# Patient Record
(demographics unavailable — no encounter records)

---

## 2018-01-11 NOTE — RESULT NOTES
Verified Results  (LC) TSH Rfx on Abnormal to Free T4 21Mar2016 10:00AM Maylin Darby     Test Name Result Flag Reference   TSH 1 190 uIU/mL  0 450-4 500

## 2018-01-11 NOTE — RESULT NOTES
Verified Results  (1) CBC/PLT/DIFF [id number] 10:00AM [employer]     Test Name Result Flag Reference   WBC 6 4 x10E3/uL  3 4-10 8   RBC 4 38 x10E6/uL  3 77-5 28   Hemoglobin 11 7 g/dL  11 1-15 9   Hematocrit 37 0 %  34 0-46  6   MCV 85 fL  79-97   MCH 26 7 pg  26 6-33 0   MCHC 31 6 g/dL  31 5-35 7   RDW 15 7 % H 12 3-15 4   Platelets 859 N07K1/GZ  150-379   Neutrophils 60 %     Lymphs 31 %     Monocytes 7 %     Eos 1 %     Basos 1 %     Neutrophils (Absolute) 3 9 x10E3/uL  1 4-7 0   Lymphs (Absolute) 2 0 x10E3/uL  0 7-3 1   Monocytes(Absolute) 0 4 x10E3/uL  0 1-0 9   Eos (Absolute) 0 1 x10E3/uL  0 0-0 4   Baso (Absolute) 0 0 x10E3/uL  0 0-0 2   Immature Granulocytes 0 %     Immature Grans (Abs) 0 0 x10E3/uL  0 0-0 1     (1) COMPREHENSIVE METABOLIC PANEL [id number] 90:78EN [employer]     Test Name Result Flag Reference   Glucose, Serum 94 mg/dL  65-99   BUN 10 mg/dL  6-24   Creatinine, Serum 0 79 mg/dL  0 57-1 00   eGFR If NonAfricn Am 92 mL/min/1 73  >59   eGFR If Africn Am 106 mL/min/1 73  >59   BUN/Creatinine Ratio 13  9-23   Sodium, Serum 139 mmol/L  134-144   Potassium, Serum 4 8 mmol/L  3 5-5 2   Chloride, Serum 102 mmol/L     Carbon Dioxide, Total 22 mmol/L  18-29   Calcium, Serum 8 8 mg/dL  8 7-10 2   Protein, Total, Serum 6 8 g/dL  6 0-8 5   Albumin, Serum 4 2 g/dL  3 5-5 5   Globulin, Total 2 6 g/dL  1 5-4 5   A/G Ratio 1 6  1 1-2 5   Bilirubin, Total 0 4 mg/dL  0 0-1 2   Alkaline Phosphatase, S 67 IU/L     AST (SGOT) 19 IU/L  0-40   ALT (SGPT) 13 IU/L  0-32     (1) VITAMIN D 25-HYDROXY 21Mar2016 10:00AM Alta Felton     Test Name Result Flag Reference   Vitamin D, 25-Hydroxy 28 8 ng/mL L 30 0-100 0   Vitamin D deficiency has been defined by the [place] of  Medicine and an Endocrine Society practice guideline as a  level of serum 25-OH vitamin D less than 20 ng/mL (1,2)    The Endocrine Society went on to further define vitamin D  insufficiency as a level between 21 and 29 ng/mL (2)   1  IOM ([place] of [employer])  2010  Dietary reference     intakes for calcium and D  [facility]: The     [employer]  2  Alisson MF, Bobby BOYER, Radha HAMPTON, et al      Evaluation, treatment, and prevention of vitamin D     deficiency: an Endocrine Society clinical practice     guideline  [facility]  2011 Jul; 96(7):1911-30  (1) VITAMIN B12 21Mar2016 10:00AM [employer]     Test Name Result Flag Reference   Vitamin B12 1076 pg/mL H 211-946     (LC) LP [id number] 10:00AM [employer]     Test Name Result Flag Reference   Cholesterol, Total 270 mg/dL H 100-199   Triglycerides 123 mg/dL  0-149   HDL Cholesterol 68 mg/dL  >39   According to ATP-III Guidelines, HDL-C >59 mg/dL is considered a  negative risk factor for CHD     VLDL Cholesterol Antonio 25 mg/dL  5-40   LDL Cholesterol Calc 177 mg/dL H 0-99

## 2018-01-18 NOTE — PROGRESS NOTES
Assessment    1  Sore throat (462) (J02 9)    Discussion/Summary  Discussion Summary:   Symptoms are likely secondary to allergies  I do not see any evidence of streptococcal sore throat  Rapid strep test is negative  At this point I advised to use Zyrtec D/Claritin-D/Allegra-D for not more than 5-7 days followed by plain Zyrtec Claritin or Allegra  If symptoms not improving or worse, follow-up with PCP for reevaluation in 7 days  Medication Side Effects Reviewed: Possible side effects of new medications were reviewed with the patient/guardian today  Understands and agrees with treatment plan: The treatment plan was reviewed with the patient/guardian  The patient/guardian understands and agrees with the treatment plan      Chief Complaint    1  Sore Throat  Chief Complaint Free Text Note Form: Pt with sore throat for 3 day  and headache today  Denies fever  History of Present Illness  Hospital Based Practices Required Assessment:   Pain Assessment   the patient states they have pain  The pain is located in the throat  The patient describes the pain as aching  (on a scale of 0 to 10, the patient rates the pain at 3 )   Abuse And Domestic Violence Screen    Yes, the patient is safe at home  The patient states no one is hurting them  Depression And Suicide Screen  No, the patient has not had thoughts of hurting themself  No, the patient has not felt depressed in the past 7 days  Prefered Language is  english  Primary Language is  english  Sore Throat: [address] presents with complaints of sore throat  Associated symptoms include dysphagia, nasal congestion, postnasal drainage, headache and cough, but no odynophagia, no swollen glands, no myalgias, no drooling, no stridor, no fever, no chills, no hoarseness, no neck stiffness, no ear pain, no facial pain, no nausea, no vomiting, no rash, no anorexia and no fatigue        Review of Systems  Focused-Female:   Constitutional: as noted in HPI       Active Problems    1  Anxiety (300 00) (F41 9)   2  Dyslipidemia (272 4) (E78 5)   3  Encounter for preventive health examination (V70 0) (Z00 00)   4  Fatigue (780 79) (R53 83)   5  Flu vaccine need (V04 81) (Z23)   6  Hemorrhoids (455 6) (K64 9)   7  Menstrual migraine without status migrainosus, not intractable (346 40) (G43 829)   8  Obesity (278 00) (E66 9)   9  Vitamin D deficiency (268 9) (E55 9)    Past Medical History    1  History of renal calculi (V13 01) (X53 600)    Family History    1  Family history of hypertension (V17 49) (Z82 49)    2  Family history of malignant neoplasm of prostate (V16 42) (Z80 42)    3  Family history of diabetes mellitus (V18 0) (Z83 3)    4  Family history of glaucoma (V19 11) (Z83 511)    5  Family history of malignant neoplasm of stomach (V16 0) (Z80 0)    Social History    · Caffeine use (V49 89) (F15 90)   · Never smoker   · No drug use   · Rarely consumes alcohol (V49 89) (Z78 9)    Current Meds   1  Multivitamin TABS; TAKE 1 TABLET DAILY; Therapy: (Recorded:21Mar2016) to Recorded   2  Vitamin B Complex CAPS; TAKE 1 CAPSULE DAILY; Therapy: ([facility]) to Recorded   3  Vitamin D 2000 UNIT Oral Tablet; Take 1 tablet daily; Therapy: (Recorded:21Mar2016) to Recorded    Allergies    1  No Known Drug Allergies    Vitals  Signs [Data Includes: Current Encounter]   Recorded: 10Apr2016 11:29AM   Temperature: 97 9 F  Heart Rate: 84  Respiration: 20  Systolic: 031  Diastolic: 78  Height: 5 ft 4 5 in  Weight: 222 lb 12 8 oz  BMI Calculated: 37 65  BSA Calculated: 2 06  O2 Saturation: 100  Pain Scale: 3    Physical Exam    Constitutional   General appearance: No acute distress, well appearing and well nourished  Ears, Nose, Mouth, and Throat   External inspection of ears and nose: Normal     Otoscopic examination: Tympanic membranes translucent with normal light reflex  Canals patent without erythema      Nasal mucosa, septum, and turbinates: Abnormal  There was a mucoid discharge from both nares  The bilateral nasal mucosa was edematous  Oropharynx: Normal with no erythema, edema, exudate or lesions  Pulmonary   Respiratory effort: No increased work of breathing or signs of respiratory distress  Auscultation of lungs: Clear to auscultation  Cardiovascular   Auscultation of heart: Normal rate and rhythm, normal S1 and S2, without murmurs  Lymphatic   Palpation of lymph nodes in neck: Abnormal   no anterior cervical node enlargement, no posterior cervical node enlargement and no submandibular node enlargement        Future Appointments    Date/Time Provider Specialty Site   06/21/2016 08:30 AM Nerissa Rodriguez MD Family Medicine [address] [place] [address]     Signatures   Electronically signed by : DIGNA Qureshi ; Apr 10 2016 11:39AM EST                       (Author)

## 2021-04-08 DIAGNOSIS — Z23 ENCOUNTER FOR IMMUNIZATION: ICD-10-CM
